# Patient Record
Sex: MALE | Race: OTHER | Employment: UNEMPLOYED | URBAN - METROPOLITAN AREA
[De-identification: names, ages, dates, MRNs, and addresses within clinical notes are randomized per-mention and may not be internally consistent; named-entity substitution may affect disease eponyms.]

---

## 2021-10-11 ENCOUNTER — TRANSCRIBE ORDER (OUTPATIENT)
Dept: GENERAL RADIOLOGY | Age: 31
End: 2021-10-11

## 2021-10-11 ENCOUNTER — HOSPITAL ENCOUNTER (OUTPATIENT)
Dept: GENERAL RADIOLOGY | Age: 31
Discharge: HOME | End: 2021-10-11
Payer: COMMERCIAL

## 2021-10-11 DIAGNOSIS — S93.401A SPRAIN OF RIGHT ANKLE: ICD-10-CM

## 2021-10-11 DIAGNOSIS — S93.401A SPRAIN OF RIGHT ANKLE: Primary | ICD-10-CM

## 2021-10-11 PROCEDURE — 73610 X-RAY EXAM OF ANKLE: CPT

## 2023-06-08 ENCOUNTER — HOSPITAL ENCOUNTER (EMERGENCY)
Facility: HOSPITAL | Age: 33
Discharge: ANOTHER HOSPITAL | End: 2023-06-09
Attending: EMERGENCY MEDICINE
Payer: COMMERCIAL

## 2023-06-08 VITALS
TEMPERATURE: 98.2 F | HEIGHT: 71 IN | DIASTOLIC BLOOD PRESSURE: 83 MMHG | HEART RATE: 70 BPM | RESPIRATION RATE: 18 BRPM | OXYGEN SATURATION: 97 % | SYSTOLIC BLOOD PRESSURE: 133 MMHG | WEIGHT: 209 LBS | BODY MASS INDEX: 29.26 KG/M2

## 2023-06-08 DIAGNOSIS — T14.91XA SUICIDE ATTEMPT (HCC): ICD-10-CM

## 2023-06-08 DIAGNOSIS — F32.A DEPRESSION WITH SUICIDAL IDEATION: Primary | ICD-10-CM

## 2023-06-08 DIAGNOSIS — R45.851 DEPRESSION WITH SUICIDAL IDEATION: Primary | ICD-10-CM

## 2023-06-08 LAB
ALBUMIN SERPL-MCNC: 4.3 G/DL (ref 3.5–5)
ALBUMIN/GLOB SERPL: 1.3 (ref 1.1–2.2)
ALP SERPL-CCNC: 84 U/L (ref 45–117)
ALT SERPL-CCNC: 23 U/L (ref 12–78)
AMPHET UR QL SCN: NEGATIVE
ANION GAP SERPL CALC-SCNC: 4 MMOL/L (ref 5–15)
APAP SERPL-MCNC: <2 UG/ML (ref 10–30)
AST SERPL-CCNC: 25 U/L (ref 15–37)
BARBITURATES UR QL SCN: NEGATIVE
BASOPHILS # BLD: 0 K/UL (ref 0–0.1)
BASOPHILS NFR BLD: 0 % (ref 0–1)
BENZODIAZ UR QL: NEGATIVE
BILIRUB SERPL-MCNC: 0.6 MG/DL (ref 0.2–1)
BUN SERPL-MCNC: 13 MG/DL (ref 6–20)
BUN/CREAT SERPL: 13 (ref 12–20)
CALCIUM SERPL-MCNC: 9.5 MG/DL (ref 8.5–10.1)
CANNABINOIDS UR QL SCN: POSITIVE
CHLORIDE SERPL-SCNC: 108 MMOL/L (ref 97–108)
CO2 SERPL-SCNC: 26 MMOL/L (ref 21–32)
COCAINE UR QL SCN: NEGATIVE
COMMENT:: NORMAL
CREAT SERPL-MCNC: 1.02 MG/DL (ref 0.7–1.3)
DIFFERENTIAL METHOD BLD: NORMAL
EOSINOPHIL # BLD: 0.1 K/UL (ref 0–0.4)
EOSINOPHIL NFR BLD: 1 % (ref 0–7)
ERYTHROCYTE [DISTWIDTH] IN BLOOD BY AUTOMATED COUNT: 13 % (ref 11.5–14.5)
ETHANOL SERPL-MCNC: <10 MG/DL (ref 0–0.08)
FLUAV RNA SPEC QL NAA+PROBE: NOT DETECTED
FLUBV RNA SPEC QL NAA+PROBE: NOT DETECTED
GLOBULIN SER CALC-MCNC: 3.4 G/DL (ref 2–4)
GLUCOSE SERPL-MCNC: 91 MG/DL (ref 65–100)
HCT VFR BLD AUTO: 40.6 % (ref 36.6–50.3)
HGB BLD-MCNC: 13.9 G/DL (ref 12.1–17)
IMM GRANULOCYTES # BLD AUTO: 0 K/UL (ref 0–0.04)
IMM GRANULOCYTES NFR BLD AUTO: 0 % (ref 0–0.5)
LYMPHOCYTES # BLD: 2.8 K/UL (ref 0.8–3.5)
LYMPHOCYTES NFR BLD: 38 % (ref 12–49)
Lab: ABNORMAL
MCH RBC QN AUTO: 30.1 PG (ref 26–34)
MCHC RBC AUTO-ENTMCNC: 34.2 G/DL (ref 30–36.5)
MCV RBC AUTO: 87.9 FL (ref 80–99)
METHADONE UR QL: NEGATIVE
MONOCYTES # BLD: 0.5 K/UL (ref 0–1)
MONOCYTES NFR BLD: 7 % (ref 5–13)
NEUTS SEG # BLD: 3.9 K/UL (ref 1.8–8)
NEUTS SEG NFR BLD: 54 % (ref 32–75)
NRBC # BLD: 0 K/UL (ref 0–0.01)
NRBC BLD-RTO: 0 PER 100 WBC
OPIATES UR QL: NEGATIVE
PCP UR QL: NEGATIVE
PLATELET # BLD AUTO: 293 K/UL (ref 150–400)
PMV BLD AUTO: 9.4 FL (ref 8.9–12.9)
POTASSIUM SERPL-SCNC: 3.6 MMOL/L (ref 3.5–5.1)
PROT SERPL-MCNC: 7.7 G/DL (ref 6.4–8.2)
RBC # BLD AUTO: 4.62 M/UL (ref 4.1–5.7)
SALICYLATES SERPL-MCNC: <1.7 MG/DL (ref 2.8–20)
SARS-COV-2 RNA RESP QL NAA+PROBE: NOT DETECTED
SODIUM SERPL-SCNC: 138 MMOL/L (ref 136–145)
SPECIMEN HOLD: NORMAL
WBC # BLD AUTO: 7.3 K/UL (ref 4.1–11.1)

## 2023-06-08 PROCEDURE — 80053 COMPREHEN METABOLIC PANEL: CPT

## 2023-06-08 PROCEDURE — 85025 COMPLETE CBC W/AUTO DIFF WBC: CPT

## 2023-06-08 PROCEDURE — 82077 ASSAY SPEC XCP UR&BREATH IA: CPT

## 2023-06-08 PROCEDURE — 36415 COLL VENOUS BLD VENIPUNCTURE: CPT

## 2023-06-08 PROCEDURE — 80307 DRUG TEST PRSMV CHEM ANLYZR: CPT

## 2023-06-08 PROCEDURE — 87636 SARSCOV2 & INF A&B AMP PRB: CPT

## 2023-06-08 PROCEDURE — 80179 DRUG ASSAY SALICYLATE: CPT

## 2023-06-08 PROCEDURE — 80143 DRUG ASSAY ACETAMINOPHEN: CPT

## 2023-06-08 ASSESSMENT — PAIN - FUNCTIONAL ASSESSMENT: PAIN_FUNCTIONAL_ASSESSMENT: NONE - DENIES PAIN

## 2023-06-09 ASSESSMENT — ENCOUNTER SYMPTOMS
VOMITING: 0
DIARRHEA: 0
NAUSEA: 0
ABDOMINAL PAIN: 0
SHORTNESS OF BREATH: 0
RHINORRHEA: 0

## 2023-06-09 NOTE — ED PROVIDER NOTES
Transportation is to be arranged, with estimated arrival time at 2:30 in the morning. Patient remains on voluntary admission status and has remained calm, cooperative during his emergency room stay. 12:06 AM  Change of shift. Care of patient signed over to [facility] MD.  Bedside handoff complete. Awaiting transport to [facility]        (Please note that portions of this note were completed with a voice recognition program.  Efforts were made to edit the dictations but occasionally words are mis-transcribed.)    YARY Gamboa NP (electronically signed)  Emergency Attending Physician / Physician Assistant / Nurse Practitioner             YARY Mcgill NP  06/09/23 0006

## 2023-06-09 NOTE — ED TRIAGE NOTES
Pt states yesterday he attempted to end his life by \"drinking so much that I wouldn't wake up\"  I talked to my mental health provider today and he suggested I come in today to be seen  States hx of inpatient treatment in [place] in 2019.  States he is former

## 2023-06-09 NOTE — BSMART NOTE
Patient admitted to [address] [place] [address] [id number]. Dr. Sugey Vicente ; admitting psychiatrist Dr. Aneta Lugo.  Nurse report [phone]

## 2023-06-09 NOTE — ED NOTES
Report called to [facility]IDALIA ANDINO RN at [facility].       Luzma Tinajero RN  06/09/23 [id number]

## 2023-06-09 NOTE — BSMART NOTE
BSMART assessment completed, and suicide risk level noted to be high. Primary Nurse Kristy Hidalgo and NP Esther Gain notified. Concerns not observed. Security/Off- has not been notified.

## 2023-06-09 NOTE — BSMART NOTE
Comprehensive Assessment Form Part 1    Section I - Disposition    Axis I - Major Depressive Disorder   Axis II - Deferred  Axis III - None  Axis IV - Housing stressors, Relationship stressors, Financial stressors, Employment stressors  [place] V - 35    The Medical Doctor to Psychiatrist conference was not completed. The Medical Doctor is in agreement with Psychiatrist disposition because of (reason) patient is willing to be admitted voluntarily. The plan is admit to inpatient psych once medically cleared. The on-call Psychiatrist consulted was Dr. Kaylen Willis. The admitting Psychiatrist will be Dr. Kaylen Willis. The admitting Diagnosis is Major Depression. The Payor source is [employer]. This writer reviewed the [employer] in nursing flowsheet and the risk level assigned is high. Based on this assessment, the risk of suicide is high and the plan is admit to inpatient psych. Section II - Integrated Summary  Summary:  Patient is a 27year old male seen via telepsych. He reports he attempted suicide last night by \"drinking so much I wouldn't wake up. \"  He spoke to his therapist Dr. Joe Barth today who encouraged him to come to the ER to request admission. Patient reported he doesn't drink regularly, and last night became tired while drinking and went to sleep. He reported he has not been sleeping well for the past month but last night actually slept pretty well. His appetite is poor and he has not eaten anything for the past 2 days. He reported he still wants to die and is having suicidal thoughts, but he does not currently have a plan for suicide. He has never attempted suicide prior to last night, however he was admitted to a [facility] in [place] in 2020 for suicidal thoughts. He denied homicidal ideation and symptoms of psychosis.   He reported having recently been diagnosed with MDD and BPD by his psychologist.  He is not currently taking any medications although he has in the past.  He reported numerous